# Patient Record
Sex: FEMALE | Race: WHITE | NOT HISPANIC OR LATINO | Employment: UNEMPLOYED | URBAN - METROPOLITAN AREA
[De-identification: names, ages, dates, MRNs, and addresses within clinical notes are randomized per-mention and may not be internally consistent; named-entity substitution may affect disease eponyms.]

---

## 2019-10-13 PROBLEM — F32.9 MAJOR DEPRESSION: Status: ACTIVE | Noted: 2019-10-13

## 2019-10-14 PROBLEM — F10.10 ALCOHOL ABUSE: Chronic | Status: ACTIVE | Noted: 2019-10-14

## 2019-10-14 PROBLEM — Z13.9 ENCOUNTER FOR MEDICAL SCREENING EXAMINATION: Status: ACTIVE | Noted: 2019-10-14

## 2019-10-14 PROBLEM — G25.81 RESTLESS LEG SYNDROME: Status: ACTIVE | Noted: 2019-10-14

## 2019-10-15 PROBLEM — F10.939 ALCOHOL WITHDRAWAL SYNDROME WITH COMPLICATION: Status: ACTIVE | Noted: 2019-10-15

## 2019-10-15 PROBLEM — F33.2 MAJOR DEPRESSIVE DISORDER, RECURRENT SEVERE WITHOUT PSYCHOTIC FEATURES: Status: ACTIVE | Noted: 2019-10-15

## 2019-10-15 PROBLEM — F10.20 ALCOHOL USE DISORDER, SEVERE, DEPENDENCE: Status: ACTIVE | Noted: 2019-10-15

## 2020-01-13 PROBLEM — Z13.9 ENCOUNTER FOR MEDICAL SCREENING EXAMINATION: Status: RESOLVED | Noted: 2019-10-14 | Resolved: 2020-01-13

## 2023-03-22 ENCOUNTER — TELEPHONE (OUTPATIENT)
Dept: SURGERY | Facility: CLINIC | Age: 48
End: 2023-03-22
Payer: MEDICAID

## 2023-03-22 NOTE — TELEPHONE ENCOUNTER
----- Message from Meg Monroe sent at 3/22/2023 12:56 PM CDT -----  Patient is calling in regards to rescheduling fo colonoscopy..Please call her back at [phone]

## 2023-03-22 NOTE — TELEPHONE ENCOUNTER
----- Message from Ange Francis LPN sent at 3/21/2023  4:49 PM CDT -----  Contact: sukh    ----- Message -----  From: Alexei Patiño  Sent: 3/21/2023   2:17 PM CDT  To: [facility] General Surgery Clinical Support Staff    Patient is calling to reschedule her surgery. Please call her back at [phone].          Thanks  DD

## 2023-03-22 NOTE — TELEPHONE ENCOUNTER
Called pt back but was unable to reach. LM for pt to call back to r/s colonoscopy and nurse visit.

## 2023-04-06 ENCOUNTER — CLINICAL SUPPORT (OUTPATIENT)
Dept: SURGERY | Facility: CLINIC | Age: 48
End: 2023-04-06
Payer: MEDICAID

## 2023-04-06 VITALS — WEIGHT: 254 LBS | BODY MASS INDEX: 45 KG/M2 | HEIGHT: 63 IN

## 2023-04-06 DIAGNOSIS — Z80.0 FAMILY HISTORY OF COLON CANCER IN FATHER: ICD-10-CM

## 2023-04-06 DIAGNOSIS — Z12.11 ENCOUNTER FOR SCREENING COLONOSCOPY: Primary | ICD-10-CM

## 2023-04-06 RX ORDER — ARIPIPRAZOLE 400 MG
KIT INTRAMUSCULAR
COMMUNITY
Start: 2023-04-05

## 2023-04-06 RX ORDER — DEXTROAMPHETAMINE SULFATE, DEXTROAMPHETAMINE SACCHARATE, AMPHETAMINE SULFATE AND AMPHETAMINE ASPARTATE 7.5; 7.5; 7.5; 7.5 MG/1; MG/1; MG/1; MG/1
CAPSULE, EXTENDED RELEASE ORAL EVERY MORNING
COMMUNITY
Start: 2023-03-17

## 2023-04-06 NOTE — PROGRESS NOTES
Pt here for colonoscopy instructions. Pt is scheduled for colonoscopy w/ Dr. Duenas's 2023. Pt states she's not having any issues at this time and is getting a screening colonoscopy. Pt's dad  from colon cancer. Went over instructions with pt and she verbalized understanding. Pt given instruction packet and all questions were answered.

## 2023-04-14 NOTE — PROGRESS NOTES
"[facility] - General Surgery  [address], [address]  [place] [address]  Phone: [phone]  Fax: [phone]    History & Physical         Provider: Dr. Maxwell Boo DO    Patient Name: aSmmie GREGG (age):1975  47 y.o.           Gender: female   Phone: [phone]     Referring Physician:  Ebony Patiño MD    Vital Signs:   Height - 5' 3"  Weight - 115.2 kg  BMI -  44.99 kg/m2    Plan: Colonoscopy    Encounter Diagnoses   Name Primary?    Encounter for screening colonoscopy Yes    Family history of colon cancer in father            History:      Past Medical History:   Diagnosis Date    Addiction to drug     ADHD (attention deficit hyperactivity disorder)     Alcohol use disorder, severe, dependence 10/15/2019    Alcohol withdrawal syndrome with complication 10/15/2019    Major depressive disorder, recurrent severe without psychotic features 10/15/2019      Past Surgical History:   Procedure Laterality Date    CHOLECYSTECTOMY      HYSTERECTOMY      KNEE ARTHROPLASTY Right 2023      Medication List with Changes/Refills   Current Medications    ABILIFY MAINTENA 400 MG SERS INJECTION (PRE-FILLED DUAL CHAMBER)        ADDERALL XR 30 MG 24 HR CAPSULE    Take by mouth every morning.    DULOXETINE (CYMBALTA) 20 MG CAPSULE    Take 1 capsule (20 mg total) by mouth once daily.    QUETIAPINE (SEROQUEL) 50 MG TABLET    Take 1 tablet (50 mg total) by mouth every evening.    ROPINIROLE (REQUIP) 1 MG TABLET    Take 1 tablet (1 mg total) by mouth every evening.      Review of patient's allergies indicates:   Allergen Reactions    Penicillins Anaphylaxis      Family History   Problem Relation Age of Onset    Colon cancer Father         74 when he found out he had colon cancer (stage IV)      Social History     Tobacco Use    Smoking status: Every Day     Packs/day: 0.50     Types: Cigarettes    Smokeless tobacco: Never "   Substance Use Topics    Alcohol use: Not Currently    Drug use: Not Currently        Physical Examination:     General Appearance:___________________________  HEENT: _____________________________________  Abdomen:____________________________________  Heart:________________________________________  Lungs:_______________________________________  Extremities:___________________________________  Skin:_________________________________________  Endocrine:____________________________________  Genitourinary:_________________________________  Neurological:__________________________________      Patient has been evaluated immediately prior to sedation and is medically cleared for endoscopy with IVCS as an ASA class: ______      Physician Signature: _________________________       Date: ________  Time: ________

## 2023-04-23 LAB — SPECIMEN TO PATHOLOGY: NORMAL

## 2023-05-10 ENCOUNTER — TELEPHONE (OUTPATIENT)
Dept: SURGERY | Facility: CLINIC | Age: 48
End: 2023-05-10
Payer: MEDICAID

## 2023-05-10 NOTE — TELEPHONE ENCOUNTER
Pt had colonoscopy w/ Dr. Boo on 04/21/2023. Per Dr. Boo after reviewing pathology report: repeat colonoscopy in 5 years. TORB. Pt notified and verbalized understanding. Health maintenance updated in chart.